# Patient Record
Sex: MALE | Race: WHITE | NOT HISPANIC OR LATINO | Employment: FULL TIME | URBAN - METROPOLITAN AREA
[De-identification: names, ages, dates, MRNs, and addresses within clinical notes are randomized per-mention and may not be internally consistent; named-entity substitution may affect disease eponyms.]

---

## 2024-05-16 ENCOUNTER — HOSPITAL ENCOUNTER (EMERGENCY)
Facility: HOSPITAL | Age: 39
Discharge: HOME | End: 2024-05-16
Attending: SURGERY
Payer: COMMERCIAL

## 2024-05-16 VITALS
SYSTOLIC BLOOD PRESSURE: 144 MMHG | DIASTOLIC BLOOD PRESSURE: 84 MMHG | RESPIRATION RATE: 18 BRPM | OXYGEN SATURATION: 99 % | TEMPERATURE: 98 F | HEART RATE: 87 BPM | HEIGHT: 71 IN | BODY MASS INDEX: 33.72 KG/M2 | WEIGHT: 240.88 LBS

## 2024-05-16 DIAGNOSIS — K64.9 HEMORRHOIDS, UNSPECIFIED HEMORRHOID TYPE: Primary | ICD-10-CM

## 2024-05-16 PROCEDURE — 96372 THER/PROPH/DIAG INJ SC/IM: CPT | Performed by: SURGERY

## 2024-05-16 PROCEDURE — 99284 EMERGENCY DEPT VISIT MOD MDM: CPT | Mod: 25

## 2024-05-16 PROCEDURE — 63600175 PHARM REV CODE 636 W HCPCS: Mod: JZ,TB | Performed by: SURGERY

## 2024-05-16 RX ORDER — MORPHINE SULFATE 2 MG/ML
4 INJECTION, SOLUTION INTRAMUSCULAR; INTRAVENOUS
Status: COMPLETED | OUTPATIENT
Start: 2024-05-16 | End: 2024-05-16

## 2024-05-16 RX ORDER — HYDROCORTISONE 25 MG/G
CREAM TOPICAL 2 TIMES DAILY
Status: DISCONTINUED | OUTPATIENT
Start: 2024-05-16 | End: 2024-05-16 | Stop reason: HOSPADM

## 2024-05-16 RX ORDER — DOCUSATE SODIUM 100 MG/1
100 CAPSULE, LIQUID FILLED ORAL 2 TIMES DAILY PRN
Qty: 30 CAPSULE | Refills: 0 | Status: SHIPPED | OUTPATIENT
Start: 2024-05-16

## 2024-05-16 RX ORDER — ONDANSETRON HYDROCHLORIDE 2 MG/ML
4 INJECTION, SOLUTION INTRAVENOUS
Status: COMPLETED | OUTPATIENT
Start: 2024-05-16 | End: 2024-05-16

## 2024-05-16 RX ORDER — HYDROCORTISONE 25 MG/G
CREAM TOPICAL 2 TIMES DAILY
Status: DISCONTINUED | OUTPATIENT
Start: 2024-05-16 | End: 2024-05-16

## 2024-05-16 RX ORDER — HYDROCORTISONE 25 MG/G
CREAM TOPICAL 2 TIMES DAILY
Qty: 30 EACH | Refills: 0 | Status: SHIPPED | OUTPATIENT
Start: 2024-05-16

## 2024-05-16 RX ORDER — HYDROCODONE BITARTRATE AND ACETAMINOPHEN 10; 325 MG/1; MG/1
1 TABLET ORAL EVERY 6 HOURS PRN
Qty: 15 TABLET | Refills: 0 | Status: SHIPPED | OUTPATIENT
Start: 2024-05-16 | End: 2024-05-20

## 2024-05-16 RX ADMIN — ONDANSETRON 4 MG: 2 INJECTION INTRAMUSCULAR; INTRAVENOUS at 02:05

## 2024-05-16 RX ADMIN — MORPHINE SULFATE 4 MG: 2 INJECTION, SOLUTION INTRAMUSCULAR; INTRAVENOUS at 02:05

## 2024-05-16 NOTE — Clinical Note
"Mahad Mckinney (Josiah) was seen and treated in our emergency department on 5/16/2024.  He may return to work after being cleared by follow-up physician .       If you have any questions or concerns, please don't hesitate to call.      ARI Harper RN"
"Mahad Mckinney (Josiah) was seen and treated in our emergency department on 5/16/2024.  He may return to work after being cleared by follow-up physician 05/16/2024.       If you have any questions or concerns, please don't hesitate to call.       RN"
04-Aug-2018

## 2024-05-16 NOTE — ED NOTES
Patient's spouse at bedside driving patient home post medication administration.   Discharge, prescriptions and follow up precautions gone over with patient and spouse, verbalized understanding.  Patient ambulatory out of ED.

## 2024-05-16 NOTE — ED PROVIDER NOTES
Encounter Date: 5/16/2024       History     Chief Complaint   Patient presents with    Hemorrhoids     Pt c/o hemorrhoid pain      History of Present Illness  Mahad Mckinney is a 39 y.o. 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     Review of patient's allergies indicates:  No Known Allergies  History reviewed. No pertinent past medical history.  Past Surgical History:   Procedure Laterality Date    ADENOIDECTOMY      TONSILLECTOMY       No family history on file.  Social History     Tobacco Use    Smoking status: Every Day     Types: Cigarettes    Smokeless tobacco: Never   Substance Use Topics    Alcohol use: Yes     Review of Systems   Constitutional:  Negative for diaphoresis, fatigue and fever.   HENT:  Negative for ear pain, hearing loss and tinnitus.    Eyes:  Negative for pain and redness.   Respiratory:  Negative for cough, shortness of breath and wheezing.    Cardiovascular:  Negative for chest pain.   Gastrointestinal:  Positive for rectal pain. Negative for abdominal pain, constipation, diarrhea and nausea.   Musculoskeletal:  Negative for back pain, neck pain and neck stiffness.   Neurological:  Negative for dizziness, seizures, numbness and headaches.   Psychiatric/Behavioral:  Negative for confusion, decreased concentration and dysphoric mood.    All other systems reviewed and are negative.      Physical Exam     Initial Vitals [05/16/24 0144]   BP Pulse Resp Temp SpO2   (!) 186/113 92 18 97.7 °F (36.5 °C) 99 %      MAP       --         Physical Exam    Nursing note and vitals reviewed.  Constitutional: Vital signs are normal. He appears well-developed and  well-nourished. He is cooperative.   HENT:   Head: Normocephalic and atraumatic.   Mouth/Throat: Uvula is midline and mucous membranes are normal.   Eyes: Conjunctivae, EOM and lids are normal. Pupils are equal, round, and reactive to light.   Neck: Neck supple.   Normal range of motion.   Full passive range of motion without pain.     Cardiovascular:  Normal rate, regular rhythm, S1 normal, S2 normal, normal heart sounds, intact distal pulses and normal pulses.           Pulmonary/Chest: Effort normal and breath sounds normal.   Abdominal: Abdomen is soft and flat. Bowel sounds are normal.   Genitourinary:    Genitourinary Comments:   (+) rectal pain, 3 external hemorrhoids on ER evaluation  (+) 9:00 position hemorrhoids slightly thrombosed & irritated     Musculoskeletal:         General: Normal range of motion.      Cervical back: Full passive range of motion without pain, normal range of motion and neck supple.     Neurological: He is alert and oriented to person, place, and time. He has normal strength.   Skin: Skin is warm, dry and intact. Capillary refill takes less than 2 seconds.         ED Course   Procedures  Labs Reviewed - No data to display       Imaging Results    None          Medications   hydrocortisone 2.5 % rectal cream ( Rectal Not Given 5/16/24 0215)   morphine injection 4 mg (4 mg Intramuscular Given 5/16/24 0222)   ondansetron injection 4 mg (4 mg Intramuscular Given 5/16/24 0222)     Medical Decision Making  39-year-old male with hemorrhoid related pain for last 3 days on ER interview  Differential includes internal hemorrhoids, external hemorrhoids, skin tag, abscess    Problems Addressed:  Hemorrhoids, unspecified hemorrhoid type: complicated acute illness or injury    ED Management & Risks of Complication, Morbidity, & Mortality:  On exam patient was slightly thrombosed hemorrhoid at 9:00   Morphine for pain in the ER, no need for drainage in the ER now  It was a small hemorrhoid but a  painful hemorrhoid based on exam  Will prescribe Norco, stool softeners & Anusol on discharge  Patient was from the [facility] area based on interview  I have given this patient several options for Colorectal surgery follow-up  Patient needs to follow-up in clinic today for full evaluation by specialist  Pt/Family counseled to return to the ER with any concerning symptoms     Need for Hospitalization or Surgery with Social Determinants of Health:  This patient does not need to be hospitalized on ER evaluation today  The patient's diagnosis is not limited by social determinants of health  Does not require surgery or procedure (major or minor), no risk factors    Clinical Impression:  Final diagnoses:  [K64.9] Hemorrhoids, unspecified hemorrhoid type (Primary)          ED Disposition Condition    Discharge           ED Prescriptions       Medication Sig Dispense Start Date End Date Auth. Provider    HYDROcodone-acetaminophen (NORCO)  mg per tablet Take 1 tablet by mouth every 6 (six) hours as needed (pain). 15 tablet 5/16/2024 5/20/2024 David Flores MD    docusate sodium (COLACE) 100 MG capsule Take 1 capsule (100 mg total) by mouth 2 (two) times daily as needed for Constipation. 30 capsule 5/16/2024 -- David Flores MD    hydrocortisone 2.5 % cream Apply topically 2 (two) times daily. 30 each 5/16/2024 -- David Flores MD          Follow-up Information       Follow up With Specialties Details Why Contact Info    Colten Mckinney MD Colon and Rectal Surgery Go in 1 day  [address] [place] [address]  [phone]      Hang Victoria MD Colon and Rectal Surgery Go in 1 day  [address]  [address]  [facility] [address]  [phone]      Hunter Kelly MD Colon and Rectal Surgery Go in 1 day  [address] [place] [address]  [phone]               David Flores MD  05/16/24 9219